# Patient Record
Sex: MALE | Race: WHITE | NOT HISPANIC OR LATINO | URBAN - METROPOLITAN AREA
[De-identification: names, ages, dates, MRNs, and addresses within clinical notes are randomized per-mention and may not be internally consistent; named-entity substitution may affect disease eponyms.]

---

## 2022-09-16 ENCOUNTER — NURSING HOME VISIT (OUTPATIENT)
Dept: GERIATRICS | Facility: OTHER | Age: 87
End: 2022-09-16
Payer: COMMERCIAL

## 2022-09-16 DIAGNOSIS — I26.99 PE (PULMONARY THROMBOEMBOLISM) (HCC): Primary | ICD-10-CM

## 2022-09-16 DIAGNOSIS — J18.9 PNEUMONIA OF BOTH LOWER LOBES DUE TO INFECTIOUS ORGANISM: ICD-10-CM

## 2022-09-16 DIAGNOSIS — N13.9 OBSTRUCTIVE UROPATHY: ICD-10-CM

## 2022-09-16 DIAGNOSIS — R55 SYNCOPE, UNSPECIFIED SYNCOPE TYPE: ICD-10-CM

## 2022-09-16 DIAGNOSIS — E87.6 HYPOKALEMIA: ICD-10-CM

## 2022-09-16 DIAGNOSIS — R53.81 PHYSICAL DECONDITIONING: ICD-10-CM

## 2022-09-16 DIAGNOSIS — I48.91 ATRIAL FIBRILLATION WITH RVR (HCC): ICD-10-CM

## 2022-09-16 DIAGNOSIS — A41.9 SEPSIS WITHOUT ACUTE ORGAN DYSFUNCTION, DUE TO UNSPECIFIED ORGANISM (HCC): ICD-10-CM

## 2022-09-16 DIAGNOSIS — C18.7 ADENOCARCINOMA OF SIGMOID COLON (HCC): ICD-10-CM

## 2022-09-16 DIAGNOSIS — D50.0 CHRONIC BLOOD LOSS ANEMIA: ICD-10-CM

## 2022-09-16 DIAGNOSIS — R77.8 ELEVATED TROPONIN: ICD-10-CM

## 2022-09-16 DIAGNOSIS — I50.32 CHRONIC DIASTOLIC CHF (CONGESTIVE HEART FAILURE) (HCC): ICD-10-CM

## 2022-09-16 PROCEDURE — 99306 1ST NF CARE HIGH MDM 50: CPT | Performed by: FAMILY MEDICINE

## 2022-09-16 NOTE — PROGRESS NOTES
[address], [place], [address]  History and Physical  POS: SNF-31    Records Reviewed include: [facility] records    Chief Complaint/ Reason for Admission: Syncope, Afib w/RVR, Sepsis 2/2 Pneumonia, Acute PE    History of Present Illness:            80year old male admitted for SNF rehab following hospitalization at [facility]  Presented following syncopal episode while using the bathroom with unresponsive period of less than one minute  Inpatient imaging included MRI which as negative for intracranial mass and EEG which was negative for seizure; found to have acute RLL subsegmental PE with associated troponin elevation and Afib w/RVR  Eliquis had been on hold due to recent GI bleed with new diagnosis of adenocarcinoma of the colon following upper and lower endoscopy; was restarted in setting of acute PE  Evaluated by cardiology; continues on metoprolol for rate control, digoxin every other day added; HR trending in the 80s since SNF admission; noted to have soft BPs with SBP in 90s (noted during inpatient stay as well)  Initial concern for pneumonia, with CT chest showing evidence of possible chronic aspiration in b/l lower lobes; received IV antibiotic therapy initially; no cough, fever or chills reported  Patient has menendez in place, which is new as of hospitalization in August, has upcoming urology follow up, continues on tamsulosin for suspected BPH  Additional active medical issues include chronic diastolic CHF on metoprolol and torsemide         Allergies    Allergies   Allergen Reactions    Medical Tape Other (See Comments)       Past Medical History  Past Medical History:   Diagnosis Date    Anemia     Atrial fibrillation ([address] )     CHF (congestive heart failure) (HCC)     Hyperlipidemia     Hypertension     TIA (transient ischemic attack)         Past Surgical History:   Procedure Laterality Date    CATARACT EXTRACTION      TONSILLECTOMY  TOTAL HIP ARTHROPLASTY Bilateral        Family History  Family History   Problem Relation Age of Onset    Brain cancer Father     Melanoma Brother        Social History  Social History     Tobacco Use   Smoking Status Former Smoker    Packs/day: 0 50    Years: 30 00    Pack years: 15 00    Types: Cigarettes   Smokeless Tobacco Former User    Types: Chew      Social History     Substance and Sexual Activity   Alcohol Use Not Currently      Social History     Substance and Sexual Activity   Drug Use Not on file        Physical Exam    Weight: 135 2lb Temp:98 2F BP:95/60 Pulse:84 Resp:18    Physical Exam  Vitals and nursing note reviewed  Constitutional:       General: He is awake  He is not in acute distress  Appearance: He is ill-appearing (chronically)  He is not toxic-appearing or diaphoretic  HENT:      Head: Normocephalic and atraumatic  Right Ear: External ear normal       Left Ear: External ear normal       Nose: No rhinorrhea  Mouth/Throat:      Mouth: Mucous membranes are moist    Eyes:      General: No scleral icterus  Right eye: No discharge  Left eye: No discharge  Conjunctiva/sclera: Conjunctivae normal    Pulmonary:      Effort: Pulmonary effort is normal  No respiratory distress  Abdominal:      General: There is no distension  Musculoskeletal:      Cervical back: No rigidity  Right lower leg: Edema (trace b/l) present  Left lower leg: Edema present  Skin:     Coloration: Skin is pale  Skin is not jaundiced  Neurological:      Mental Status: He is alert  Cranial Nerves: No dysarthria or facial asymmetry  Psychiatric:         Attention and Perception: Attention and perception normal          Behavior: Behavior is cooperative  Review of Systems:  Review of Systems   Constitutional: Negative for chills and fever  Respiratory: Negative for cough and shortness of breath  Cardiovascular: Negative for leg swelling  Gastrointestinal: Positive for diarrhea  Negative for abdominal pain, blood in stool, nausea and vomiting  Genitourinary: Negative for hematuria  Neurological: Negative for dizziness, syncope and light-headedness  All other systems reviewed and are negative        List of Current Medications: Medication list reviewed and updated in Epic to reflect most current [facility] orders    Labs/Diagnostics (reviewed by this provider): Hospital Paperwork  CBC: Hb:8 7  BMP: K:3 4 Cr:1 00  D-dimer: 2 74    Microbiology:  Urine Cx: no growth    Imaging Reviewed:  EKG: (9/12/22) Afib w/RVR  CT Chest (9/12/22) RLL PE in subsegmental artery; moderate b/l pleural effusions with compressive atelectasis; scattered punctate densities in b/l lower lobes  B/l LE venous duplex (9/14/22) negative for DVT b/l  EEG (9/12/22) consistent with mild diffuse encephalopathy  MRI brain (9/12/22) no acute infarct, hemorrhage; no mass lesion    Assessment/Plan:  80year old male with:    PE (pulmonary thromboembolism) ([address] )  Acute RLL subsegmental  Continue anticoagulation with eliquis  Monitor respiratory status and oxygen levels closely    Pneumonia  Initial IV antibiotics; concern for bilateral chronic aspiration per CT chest  WBC WNL, asymptomatic; continue to observe off on antibiotic therapy  Change mucinex to BID PRN    Atrial fibrillation with RVR (HCC)  Rate/rhythm control with metoprolol and digoxin; goal HR<100  Continue anticoagulation with eliquis    Syncope  Likely component of vasovagal as occurred when having a bowel movement; additionally with Afib w/RVR and acute PE as component- management as outlined  Add hold parameters for metoprolol and torsemide    Sepsis ([address] )  In setting of pneumonia; with recent Ecoli bacteremia in setting of newly found colon cancer  Afebrile, hemodynamically stable; monitor closely  CBC, BMP ordered for 9/19    Chronic diastolic CHF (congestive heart failure) ([address] )  EF 60-65% 12/21  Monitor daily weights/CHF pathway  Continue metoprolol, torsemide- hold parameters added  BMP ordered for 9/19    Hypokalemia  In setting of diuretic use, chronic loose stool in setting of colon cancer  Continue PO KCl supplementation  Most recent Mg level 1 7- plan to recheck if K remains low on follow up labs  BMP ordered for 9/19    Chronic blood loss anemia  With recent acute GI bleed; in setting of chronic blood loss related to colon adenocarcinoma  Continue pantoprazole BID  Monitor Hgb closely with recent eliquis restart for anticoagulation for Afib and PE  CBC ordered for 9/19    Elevated troponin  Likely secondary to demand in setting of PE, pneumonia, Afib w/RVR    Obstructive uropathy  Rodriguez catheter in place- newly placed during recent hospitalization  Continue tamsulosin  Follow up with urology 9/22 as scheduled    Adenocarcinoma of sigmoid colon [facility])  Newly diagnosed during hospital stay in August  With associated anemia and chronic loose stool  Outpatient oncology follow up    Physical deconditioning  Multifactorial in setting of above  With baseline ambulatory dysfunction  Admit to SNF for rehab  PT/OT consults placed- evaluate and treat  Supportive care, nutritional support, ADL support  Fall precautions  Management of acute and chronic medical conditions as outlined    Pain: No  Rehab Potential:Fair  Patient Informed of Medical Condition: Yes  Patient is Capable of Understanding Their Right: Yes  Prognosis:Fair  Discharge Plan:   Surrogate Decision Maker:  Advanced Directives:   Code status:Code status order updated to DNR/DNI while at SNF  PCP: Sayra Dobson MD    Immunization History   Administered Date(s) Administered    COVID-19 PFIZER VACCINE 0 3 ML IM 03/02/2021, 03/22/2021, 10/05/2021    INFLUENZA 11/14/2005, 11/15/2006, 11/15/2007, 11/07/2008, 10/12/2009, 10/18/2010, 11/14/2011, 11/14/2012, 10/16/2013, 10/09/2014    Influenza Split High Dose Preservative Free IM 11/20/2015, 11/02/2016, 10/09/2017, 10/08/2018, 11/04/2019    Influenza, Seasonal Vaccine, Quadrivalent, Adjuvanted,  5e 11/11/2020, 10/05/2021    Pneumococcal Conjugate 13-Valent 01/04/2016    Pneumococcal Polysaccharide PPV23 11/11/1998, 11/15/2006    Td (adult), Unspecified 05/13/1991, 10/09/2002    Tdap 03/13/2017     Albert Pichardo DO  9/16/22

## 2022-09-19 PROBLEM — R55 SYNCOPE: Status: ACTIVE | Noted: 2022-09-12

## 2022-09-19 PROBLEM — I48.91 ATRIAL FIBRILLATION WITH RVR (HCC): Status: ACTIVE | Noted: 2022-09-12

## 2022-09-19 PROBLEM — J18.9 PNEUMONIA: Status: ACTIVE | Noted: 2022-09-12

## 2022-09-19 PROBLEM — E87.6 HYPOKALEMIA: Status: ACTIVE | Noted: 2022-09-19

## 2022-09-19 PROBLEM — R53.81 PHYSICAL DECONDITIONING: Status: ACTIVE | Noted: 2022-09-19

## 2022-09-19 PROBLEM — I50.32 CHRONIC DIASTOLIC CHF (CONGESTIVE HEART FAILURE) (HCC): Status: ACTIVE | Noted: 2022-09-08

## 2022-09-19 PROBLEM — D64.9 ANEMIA: Status: ACTIVE | Noted: 2022-08-14

## 2022-09-19 PROBLEM — C18.7 ADENOCARCINOMA OF SIGMOID COLON (HCC): Status: ACTIVE | Noted: 2022-08-27

## 2022-09-19 PROBLEM — R77.8 ELEVATED TROPONIN: Status: ACTIVE | Noted: 2022-09-19

## 2022-09-19 PROBLEM — K92.2 GI BLEED: Status: ACTIVE | Noted: 2022-08-14

## 2022-09-19 PROBLEM — I26.99 PE (PULMONARY THROMBOEMBOLISM) (HCC): Status: ACTIVE | Noted: 2022-09-13

## 2022-09-19 PROBLEM — A41.9 SEPSIS (HCC): Status: ACTIVE | Noted: 2022-09-12

## 2022-09-19 PROBLEM — D50.0 CHRONIC BLOOD LOSS ANEMIA: Status: ACTIVE | Noted: 2022-08-14

## 2022-09-19 PROBLEM — N13.9 OBSTRUCTIVE UROPATHY: Status: ACTIVE | Noted: 2022-09-19

## 2022-09-19 RX ORDER — GUAIFENESIN 600 MG/1
600 TABLET, EXTENDED RELEASE ORAL EVERY 12 HOURS PRN
COMMUNITY

## 2022-09-19 RX ORDER — POTASSIUM CHLORIDE 20 MEQ/1
20 TABLET, EXTENDED RELEASE ORAL DAILY
COMMUNITY
Start: 2022-08-31 | End: 2023-08-31

## 2022-09-19 RX ORDER — DIAPER,BRIEF,ADULT, DISPOSABLE
1200 EACH MISCELLANEOUS DAILY
COMMUNITY

## 2022-09-19 RX ORDER — FLUTICASONE PROPIONATE 50 MCG
2 SPRAY, SUSPENSION (ML) NASAL DAILY
COMMUNITY
Start: 2022-05-09

## 2022-09-19 RX ORDER — TAMSULOSIN HYDROCHLORIDE 0.4 MG/1
0.4 CAPSULE ORAL DAILY
COMMUNITY
Start: 2022-08-19 | End: 2023-08-19

## 2022-09-19 RX ORDER — TORSEMIDE 20 MG/1
20 TABLET ORAL DAILY
COMMUNITY
Start: 2022-08-31 | End: 2022-10-03 | Stop reason: ALTCHOICE

## 2022-09-19 RX ORDER — ASCORBIC ACID 500 MG
1 TABLET ORAL DAILY
COMMUNITY

## 2022-09-19 RX ORDER — PANTOPRAZOLE SODIUM 40 MG/1
40 TABLET, DELAYED RELEASE ORAL 2 TIMES DAILY
COMMUNITY
Start: 2022-08-19

## 2022-09-19 RX ORDER — DIGOXIN 125 MCG
0.12 TABLET ORAL EVERY OTHER DAY
COMMUNITY
Start: 2022-09-16 | End: 2023-09-16

## 2022-09-19 RX ORDER — METOPROLOL SUCCINATE 25 MG/1
12.5 TABLET, EXTENDED RELEASE ORAL DAILY
COMMUNITY
Start: 2022-09-16 | End: 2023-09-16

## 2022-09-19 NOTE — ASSESSMENT & PLAN NOTE
In setting of pneumonia; with recent Ecoli bacteremia in setting of newly found colon cancer  Afebrile, hemodynamically stable; monitor closely  CBC, BMP ordered for 9/19

## 2022-09-19 NOTE — ASSESSMENT & PLAN NOTE
Likely component of vasovagal as occurred when having a bowel movement; additionally with Afib w/RVR and acute PE as component- management as outlined  Add hold parameters for metoprolol and torsemide

## 2022-09-19 NOTE — ASSESSMENT & PLAN NOTE
Initial IV antibiotics; concern for bilateral chronic aspiration per CT chest  WBC WNL, asymptomatic; continue to observe off on antibiotic therapy  Change mucinex to BID PRN

## 2022-09-19 NOTE — ASSESSMENT & PLAN NOTE
Multifactorial in setting of above  With baseline ambulatory dysfunction  Admit to SNF for rehab  PT/OT consults placed- evaluate and treat  Supportive care, nutritional support, ADL support  Fall precautions  Management of acute and chronic medical conditions as outlined

## 2022-09-19 NOTE — ASSESSMENT & PLAN NOTE
EF 60-65% 12/21  Monitor daily weights/CHF pathway  Continue metoprolol, torsemide- hold parameters added  BMP ordered for 9/19

## 2022-09-19 NOTE — ASSESSMENT & PLAN NOTE
Rodriguez catheter in place- newly placed during recent hospitalization  Continue tamsulosin  Follow up with urology 9/22 as scheduled

## 2022-09-19 NOTE — ASSESSMENT & PLAN NOTE
In setting of diuretic use, chronic loose stool in setting of colon cancer  Continue PO KCl supplementation  Most recent Mg level 1 7- plan to recheck if K remains low on follow up labs  BMP ordered for 9/19

## 2022-09-19 NOTE — ASSESSMENT & PLAN NOTE
Acute RLL subsegmental  Continue anticoagulation with eliquis  Monitor respiratory status and oxygen levels closely

## 2022-09-19 NOTE — ASSESSMENT & PLAN NOTE
Newly diagnosed during hospital stay in August  With associated anemia and chronic loose stool  Outpatient oncology follow up

## 2022-09-19 NOTE — ASSESSMENT & PLAN NOTE
With recent acute GI bleed; in setting of chronic blood loss related to colon adenocarcinoma  Continue pantoprazole BID  Monitor Hgb closely with recent eliquis restart for anticoagulation for Afib and PE  CBC ordered for 9/19

## 2022-09-20 ENCOUNTER — NURSING HOME VISIT (OUTPATIENT)
Dept: GERIATRICS | Facility: OTHER | Age: 87
End: 2022-09-20
Payer: COMMERCIAL

## 2022-09-20 DIAGNOSIS — J18.9 PNEUMONIA OF BOTH LOWER LOBES DUE TO INFECTIOUS ORGANISM: ICD-10-CM

## 2022-09-20 DIAGNOSIS — R53.81 PHYSICAL DECONDITIONING: ICD-10-CM

## 2022-09-20 DIAGNOSIS — I50.32 CHRONIC DIASTOLIC CHF (CONGESTIVE HEART FAILURE) (HCC): ICD-10-CM

## 2022-09-20 DIAGNOSIS — D50.0 CHRONIC BLOOD LOSS ANEMIA: ICD-10-CM

## 2022-09-20 DIAGNOSIS — C18.7 ADENOCARCINOMA OF SIGMOID COLON (HCC): ICD-10-CM

## 2022-09-20 DIAGNOSIS — E87.6 HYPOKALEMIA: ICD-10-CM

## 2022-09-20 DIAGNOSIS — N13.9 OBSTRUCTIVE UROPATHY: ICD-10-CM

## 2022-09-20 DIAGNOSIS — I26.99 PE (PULMONARY THROMBOEMBOLISM) (HCC): Primary | ICD-10-CM

## 2022-09-20 PROCEDURE — 99309 SBSQ NF CARE MODERATE MDM 30: CPT | Performed by: NURSE PRACTITIONER

## 2022-09-20 NOTE — ASSESSMENT & PLAN NOTE
Acute RLL subsegmental  Respiratory status is stable  Patient is currently on Eliquis for anticoagulation    No signs of active bleeding noted

## 2022-09-20 NOTE — PROGRESS NOTES
Facility: [facility], [place], operated by [facility]  POS: 31 (STR)  Progress Note    Chief Complaint/Reason for visit: STR follow up visit  History obtained from patient, nursing staff, and EMR  History of Present Illness:  70-year-old male seen and examined for STR follow up of acute and chronic medical conditions  At time of examination, patient is seated in chair eating lunch, and appears in no distress  He denies having pain or discomfort at time of exam   Denies shortness of breath, dizziness, lightheadedness, presyncopal or syncopal episodes  Patient states that his appetite is good  He is participating in therapy and feels that he is doing well  Ambulatory with walker (has own walker)  Past Medical History: unchanged from history and physical  Past Medical History:   Diagnosis Date    Anemia     Atrial fibrillation ([address] )     CHF (congestive heart failure) (HCC)     Hyperlipidemia     Hypertension     TIA (transient ischemic attack)      Family History: unchanged from history and physical  Social History: unchanged from history and physical  Review of systems: Review of Systems   Constitutional: Negative for chills, diaphoresis and fatigue  HENT: Negative for congestion, sore throat and trouble swallowing  Eyes: Positive for visual disturbance  Respiratory: Negative for cough and shortness of breath  Cardiovascular: Negative for chest pain  Gastrointestinal: Negative for abdominal pain, constipation, diarrhea and nausea  Soft stools   Endocrine: Negative  Genitourinary: Rodriguez   Musculoskeletal: Positive for gait problem  Neurological: Negative for dizziness, syncope, speech difficulty, light-headedness, numbness and headaches  Psychiatric/Behavioral: Negative for confusion, dysphoric mood, hallucinations and sleep disturbance  The patient is not nervous/anxious  All other systems reviewed and are negative  Medications:  All medication and routine orders were reviewed and updated  Allergies: NKDA  Consults reviewed: Other  Labs/Diagnostics (reviewed by this provider): Copy in Chart    Imaging Reviewed:  None today    Physical Exam    Weight:  133 lb Temp:  98 4         BP:  110/66  Pulse:  62 Resp: 18      O2 Sat:   Constitutional: Normocephalic  Orientation:Person, Place and Day     Physical Exam  Vitals and nursing note reviewed  Constitutional:       General: He is not in acute distress  Appearance: He is not toxic-appearing or diaphoretic  Comments: Elderly male who appears with chronic illness  HENT:      Head: Normocephalic  Nose: No congestion or rhinorrhea  Mouth/Throat:      Mouth: Mucous membranes are moist       Pharynx: No oropharyngeal exudate  Eyes:      General: No scleral icterus  Right eye: No discharge  Left eye: No discharge  Extraocular Movements: Extraocular movements intact  Conjunctiva/sclera: Conjunctivae normal       Pupils: Pupils are equal, round, and reactive to light  Cardiovascular:      Rate and Rhythm: Normal rate and regular rhythm  Pulses: Normal pulses  Pulmonary:      Effort: Pulmonary effort is normal  No respiratory distress  Breath sounds: Normal breath sounds  No wheezing, rhonchi or rales  Abdominal:      General: Bowel sounds are normal  There is no distension  Palpations: Abdomen is soft  Tenderness: There is no abdominal tenderness  There is no guarding  Genitourinary:     Comments: Indwelling urinary catheter in place and patent for yellow urine  Musculoskeletal:      Cervical back: Neck supple  No rigidity  Right lower leg: Edema (Trace edema) present  Left lower leg: Edema (Trace edema) present  Comments: Moves all 4 extremities  Skin:     General: Skin is warm and dry  Capillary Refill: Capillary refill takes less than 2 seconds  Findings: Erythema present  Neurological:      Mental Status: He is alert   Mental status is at baseline  Cranial Nerves: No cranial nerve deficit  Motor: Weakness present  Gait: Gait abnormal    Psychiatric:         Mood and Affect: Mood normal          Behavior: Behavior normal          Thought Content: Thought content normal        Assessment/Plan:  51-year-old male with:    PE (pulmonary thromboembolism) ([address] )  Acute RLL subsegmental  Respiratory status is stable  Patient is currently on Eliquis for anticoagulation  No signs of active bleeding noted    Pneumonia  With concern for bilateral chronic aspiration as per CT of chest inpatient  Recent sepsis in setting of pneumonia  Patient was treated with IV antibiotics  Respiratory status is stable on room air  O2 sat 96% RA  Continue Mucinex b i d  p r n  Chronic diastolic CHF (congestive heart failure) ([facility])  EF 60-65% December 2021  Patient appears euvolemic on exam  Continue torsemide 20 mg daily with hold parameter  Continue metoprolol succinate 12 5 mg daily with hold parameter  BMP on 09/19/2022 stable for patient    Hypokalemia  In setting of diuretic use, chronic loose stool in setting of colon cancer  Most recent potassium level 3 7 on 09/19/2022    Chronic blood loss anemia  In setting of chronic blood loss related to colon adenocarcinoma  Most recent hemoglobin 9 2 on 09/19/2022  Recheck CBC on 09/26/2022    Adenocarcinoma of sigmoid colon [facility])  Newly diagnosed during hospital stay in August 2022  With chronic loose stool and anemia  Follow-up with oncology outpatient    Obstructive uropathy  Requiring indwelling urinary catheter inpatient   Continue tamsulosin  Indwelling urinary catheter to remain in place until seen by Urology on 09/22/2022    Physical deconditioning  Multifactorial  Continue PT/OT  Continue fall precautions  Provide supportive care with ADLs  Ensure adequate hydration and nutrition    This note was completed in part utilizing m-modal fluency direct voice recognition software    Grammatical errors, random word insertion, spelling mistakes, and incomplete sentences may be an occasional consequence of the system secondary to software limitations, ambient noise and hardware issues  At the time of dictation, efforts were made to edit, clarify and/or correct errors  Please read the chart carefully and recognize, using context, where substitutions have occurred  If you have any questions or concerns about the context, text or information contained within the body of this dictation, please contact myself, the provider, for further clarification      [address]  9/[id number]/47954:[id number] PM

## 2022-09-20 NOTE — ASSESSMENT & PLAN NOTE
Multifactorial  Continue PT/OT  Continue fall precautions  Provide supportive care with ADLs  Ensure adequate hydration and nutrition

## 2022-09-20 NOTE — ASSESSMENT & PLAN NOTE
In setting of diuretic use, chronic loose stool in setting of colon cancer  Most recent potassium level 3 7 on 09/19/2022

## 2022-09-20 NOTE — ASSESSMENT & PLAN NOTE
Newly diagnosed during hospital stay in August 2022  With chronic loose stool and anemia  Follow-up with oncology outpatient

## 2022-09-20 NOTE — ASSESSMENT & PLAN NOTE
Requiring indwelling urinary catheter inpatient   Continue tamsulosin  Indwelling urinary catheter to remain in place until seen by Urology on 09/22/2022

## 2022-09-20 NOTE — ASSESSMENT & PLAN NOTE
EF 60-65% December 2021  Patient appears euvolemic on exam  Continue torsemide 20 mg daily with hold parameter  Continue metoprolol succinate 12 5 mg daily with hold parameter  BMP on 09/19/2022 stable for patient

## 2022-09-20 NOTE — ASSESSMENT & PLAN NOTE
In setting of chronic blood loss related to colon adenocarcinoma  Most recent hemoglobin 9 2 on 09/19/2022  Recheck CBC on 09/26/2022

## 2022-09-23 ENCOUNTER — NURSING HOME VISIT (OUTPATIENT)
Dept: GERIATRICS | Facility: OTHER | Age: 87
End: 2022-09-23
Payer: COMMERCIAL

## 2022-09-23 DIAGNOSIS — N13.9 OBSTRUCTIVE UROPATHY: ICD-10-CM

## 2022-09-23 DIAGNOSIS — C18.7 ADENOCARCINOMA OF SIGMOID COLON (HCC): Primary | ICD-10-CM

## 2022-09-23 DIAGNOSIS — D50.0 CHRONIC BLOOD LOSS ANEMIA: ICD-10-CM

## 2022-09-23 DIAGNOSIS — I50.32 CHRONIC DIASTOLIC CHF (CONGESTIVE HEART FAILURE) (HCC): ICD-10-CM

## 2022-09-23 DIAGNOSIS — I26.99 PE (PULMONARY THROMBOEMBOLISM) (HCC): ICD-10-CM

## 2022-09-23 DIAGNOSIS — R53.81 PHYSICAL DECONDITIONING: ICD-10-CM

## 2022-09-23 DIAGNOSIS — I48.91 ATRIAL FIBRILLATION WITH RVR (HCC): ICD-10-CM

## 2022-09-23 PROCEDURE — 99309 SBSQ NF CARE MODERATE MDM 30: CPT | Performed by: NURSE PRACTITIONER

## 2022-09-23 NOTE — ASSESSMENT & PLAN NOTE
Multifactorial  Continue supportive care at SNF for ADLs  Continue PT/OT  Continue fall precautions  Provide nutritional support  Management of acute and chronic medical conditions

## 2022-09-23 NOTE — ASSESSMENT & PLAN NOTE
Patient appears dry on exam, losing fluid due to loose stools  Will discontinue torsemide  Continue metoprolol with hold parameter  Will monitor electrolytes closely

## 2022-09-23 NOTE — PROGRESS NOTES
Facility: [facility]  POS: 31 (STR)  Progress Note    Chief Complaint/Reason for visit: STR follow up  History obtained from patient, nursing staff, and EMR  Code status: DNR/DNI  History of Present Illness:  15-year-old male seen and examined for STR follow up of acute and chronic medical conditions  At time of examination,  patient is seated in chair, and appears in no distress  Patient had episode of blood mixed in stool on 09/21 evening shift and continues to have episodes of loose stools  He had loose stools x3 on evening shift and none on nightshift  He denies having pain or discomfort, SOB, or fatigue  Hemoglobin and hematocrit remained stable  Patient reports that he feels fine and ambulated yesterday from the gym to the elevator  Past Medical History: unchanged from history and physical  Past Medical History:   Diagnosis Date    Anemia     Atrial fibrillation ([address] )     CHF (congestive heart failure) (HCC)     Hyperlipidemia     Hypertension     TIA (transient ischemic attack)      Family History: unchanged from history and physical  Social History: unchanged from history and physical  Review of systems: Review of Systems   Constitutional: Negative for appetite change, chills and diaphoresis  HENT: Negative for congestion  Eyes: Positive for visual disturbance  Respiratory: Negative for cough and shortness of breath  Cardiovascular: Negative  Gastrointestinal: Negative for abdominal pain  Genitourinary:        Indwelling urinary catheter   Musculoskeletal: Positive for gait problem  Neurological: Negative for dizziness, syncope, speech difficulty, light-headedness, numbness and headaches  Psychiatric/Behavioral: Negative for agitation, behavioral problems, confusion, dysphoric mood and hallucinations  The patient is not nervous/anxious  All other systems reviewed and are negative  Medications:  All medication and routine orders were reviewed and updated  Allergies: NKDA  Consults reviewed:PT, OT and Other  Labs/Diagnostics (reviewed by this provider): Copy in Chart    Imaging Reviewed:  None today    Physical Exam    Weight:  129 4 lb Temp: 99          BP:  100/60  Pulse:  58 Resp: 18      O2 Sat:  97% on  Constitutional: Pallor frail appearing  Orientation:Person and Place     Physical Exam  Vitals and nursing note reviewed  Constitutional:       General: He is not in acute distress  Appearance: He is ill-appearing  He is not toxic-appearing or diaphoretic  Comments: Thin and frail elderly male who appears with chronic illness  HENT:      Head: Normocephalic  Nose: No congestion or rhinorrhea  Mouth/Throat:      Mouth: Mucous membranes are moist       Pharynx: No oropharyngeal exudate  Eyes:      General:         Right eye: No discharge  Left eye: No discharge  Extraocular Movements: Extraocular movements intact  Conjunctiva/sclera: Conjunctivae normal       Pupils: Pupils are equal, round, and reactive to light  Cardiovascular:      Rate and Rhythm: Normal rate  Rhythm irregular  Pulses: Normal pulses  Pulmonary:      Effort: Pulmonary effort is normal  No respiratory distress  Breath sounds: Normal breath sounds  No wheezing, rhonchi or rales  Abdominal:      General: Bowel sounds are normal  There is no distension  Palpations: Abdomen is soft  Tenderness: There is no abdominal tenderness  There is no guarding  Genitourinary:     Comments: Indwelling urinary catheter intact and patent for yellow urine  Musculoskeletal:      Cervical back: Neck supple  No rigidity  Right lower leg: No edema  Left lower leg: No edema  Comments: Moves all 4 extremities  Lymphadenopathy:      Cervical: No cervical adenopathy  Skin:     General: Skin is warm and dry  Capillary Refill: Capillary refill takes less than 2 seconds  Neurological:      Mental Status: He is alert   Mental status is at baseline  Cranial Nerves: No cranial nerve deficit  Motor: Weakness present  Gait: Gait abnormal    Psychiatric:         Mood and Affect: Mood normal          Behavior: Behavior normal          Thought Content: Thought content normal        Assessment/Plan:  80-year-old male with:    Adenocarcinoma of sigmoid colon ([facility] )  Newly diagnosed during hospital stay August 2022  Loose stools continues and nursing noted blood in stool on 09/21/2022  Hemoglobin remained stable at 9 3/hematocrit 27 3 today  Patient denies having abdominal pain, shortness a breath or increased fatigue    Chronic blood loss anemia  In setting of chronic blood loss related to colon adenocarcinoma  Awaiting hemoglobin and hematocrit test from today    Atrial fibrillation with RVR (HCC)  Heart rate trending 60s to 80s with occasional 50s  Continue metoprolol  Continue Eliquis for anticoagulation  Monitor closely due to anemia     Chronic diastolic CHF (congestive heart failure) ([place] [address] )  Patient appears dry on exam, losing fluid due to loose stools  Will discontinue torsemide  Continue metoprolol with hold parameter  Will monitor electrolytes closely    Obstructive uropathy  With indwelling urinary catheter  Continue tamsulosin  Follow up with Urology on 09/27/2022    PE (pulmonary thromboembolism) ([place] [address] )  Acute RLL subsegmental  Oxygenating well on room air  Denies having pain or discomfort  No respiratory distress episodes reported  Patient is currently on Eliquis for anticoagulation    Physical deconditioning  Multifactorial  Continue supportive care at SNF for ADLs  Continue PT/OT  Continue fall precautions  Provide nutritional support  Management of acute and chronic medical conditions    This note was completed in part utilizing GELI direct voice recognition software    Grammatical errors, random word insertion, spelling mistakes, and incomplete sentences may be an occasional consequence of the system secondary to software limitations, ambient noise and hardware issues  At the time of dictation, efforts were made to edit, clarify and/or correct errors  Please read the chart carefully and recognize, using context, where substitutions have occurred  If you have any questions or concerns about the context, text or information contained within the body of this dictation, please contact myself, the provider, for further clarification      [address], [address] [facility]  1/46/[id number]:68 AM

## 2022-09-23 NOTE — ASSESSMENT & PLAN NOTE
In setting of chronic blood loss related to colon adenocarcinoma  Awaiting hemoglobin and hematocrit test from today

## 2022-09-23 NOTE — ASSESSMENT & PLAN NOTE
Acute RLL subsegmental  Oxygenating well on room air  Denies having pain or discomfort    No respiratory distress episodes reported  Patient is currently on Eliquis for anticoagulation

## 2022-09-23 NOTE — ASSESSMENT & PLAN NOTE
Heart rate trending 60s to 80s with occasional 50s  Continue metoprolol  Continue Eliquis for anticoagulation  Monitor closely due to anemia

## 2022-09-23 NOTE — ASSESSMENT & PLAN NOTE
Newly diagnosed during hospital stay August 2022    Loose stools continues and nursing noted blood in stool on 09/21/2022  Hemoglobin remained stable at 9 3/hematocrit 27 3 today  Patient denies having abdominal pain, shortness a breath or increased fatigue

## 2022-10-03 ENCOUNTER — NURSING HOME VISIT (OUTPATIENT)
Dept: GERIATRICS | Facility: OTHER | Age: 87
End: 2022-10-03
Payer: COMMERCIAL

## 2022-10-03 DIAGNOSIS — I50.32 CHRONIC DIASTOLIC CHF (CONGESTIVE HEART FAILURE) (HCC): ICD-10-CM

## 2022-10-03 DIAGNOSIS — N13.9 OBSTRUCTIVE UROPATHY: ICD-10-CM

## 2022-10-03 DIAGNOSIS — R53.81 PHYSICAL DECONDITIONING: ICD-10-CM

## 2022-10-03 DIAGNOSIS — D50.0 CHRONIC BLOOD LOSS ANEMIA: ICD-10-CM

## 2022-10-03 DIAGNOSIS — E43 SEVERE PROTEIN-CALORIE MALNUTRITION (HCC): ICD-10-CM

## 2022-10-03 DIAGNOSIS — C18.7 ADENOCARCINOMA OF SIGMOID COLON (HCC): ICD-10-CM

## 2022-10-03 DIAGNOSIS — I48.91 ATRIAL FIBRILLATION WITH RVR (HCC): ICD-10-CM

## 2022-10-03 DIAGNOSIS — J18.9 PNEUMONIA OF BOTH LOWER LOBES DUE TO INFECTIOUS ORGANISM: ICD-10-CM

## 2022-10-03 DIAGNOSIS — K59.1 FUNCTIONAL DIARRHEA: Primary | ICD-10-CM

## 2022-10-03 PROCEDURE — 99309 SBSQ NF CARE MODERATE MDM 30: CPT | Performed by: NURSE PRACTITIONER

## 2022-10-03 NOTE — ASSESSMENT & PLAN NOTE
Heart rate stable  Continue metoprolol with hold parameter  Continue Eliquis for anticoagulation  Monitor CBC closely

## 2022-10-03 NOTE — ASSESSMENT & PLAN NOTE
In setting of chronic blood loss related to colon adenocarcinoma  Most recent hemoglobin 9 0/hematocrit 27 1

## 2022-10-03 NOTE — ASSESSMENT & PLAN NOTE
Torsemide was discontinued on 09/23/2022 due to frequent loss of fluid due to loose stools    No signs or symptoms of fluid overload noted on exam today  Continue metoprolol with hold parameter  Most recent BMP stable

## 2022-10-03 NOTE — PROGRESS NOTES
Facility: [facility]  POS: 31 (STR)  Progress Note    Chief Complaint/Reason for visit: STR follow up visit  History obtained from patient, nursing staff, and EMR  History of Present Illness:  80-year-old male seen and examined for STR follow up of acute and chronic medical conditions  Patient was evaluated by Cardiology and urology on 09/27/2022; notes reviewed  Nursing reports that patient's temp was 99 2 on 10/02/2022 with decreased appetite and decreased urine output  Vital signs has since been stable  Spoke to nurse this morning and patient's nurse felt that patient was most likely dehydrated  At time of examination, patient is seated in chair, and appears in no distress  He denies having change in appetite or change in condition  He ate 80% of his breakfast this morning and drink all of his nutritional supplement  Vital signs are stable  No issues with indwelling urinary catheter  Denies chills, fever, suprapubic tenderness, abdominal pain, or flank pain  Past Medical History: unchanged from history and physical  Past Medical History:   Diagnosis Date    Anemia     Atrial fibrillation ([address] )     CHF (congestive heart failure) (HCC)     Hyperlipidemia     Hypertension     TIA (transient ischemic attack)      Family History: unchanged from history and physical  Social History: unchanged from history and physical  Review of systems: Review of Systems   Constitutional: Negative for chills and diaphoresis  HENT: Negative for sore throat  Eyes: Positive for visual disturbance  Respiratory: Negative for cough and shortness of breath  Cardiovascular: Negative for chest pain and palpitations  Gastrointestinal: Negative for abdominal pain  Genitourinary: Negative for flank pain and penile pain  Musculoskeletal: Positive for gait problem  Neurological: Negative for dizziness and speech difficulty     Psychiatric/Behavioral: Negative for behavioral problems, confusion, dysphoric mood and hallucinations  The patient is not nervous/anxious  Medications: All medication and routine orders were reviewed and updated  Allergies: NKDA  Consults reviewed:PT, OT and Other  Labs/Diagnostics (reviewed by this provider): Copy in Chart    Imaging Reviewed:  None today    Physical Exam    Weight:  Temp: 97 4          BP:  101/67  Pulse:  74 Resp: 18      O2 Sat:  Constitutional: Normocephalic  Orientation:Person, Place and Day     Physical Exam  Vitals and nursing note reviewed  Constitutional:       General: He is not in acute distress  Appearance: He is not toxic-appearing or diaphoretic  Comments: Elderly male who appears with chronic illness  HENT:      Head: Normocephalic  Nose: No congestion or rhinorrhea  Mouth/Throat:      Mouth: Mucous membranes are moist       Pharynx: No oropharyngeal exudate  Eyes:      General: No scleral icterus  Right eye: No discharge  Left eye: No discharge  Extraocular Movements: Extraocular movements intact  Conjunctiva/sclera: Conjunctivae normal       Pupils: Pupils are equal, round, and reactive to light  Comments: Wears glasses  Cardiovascular:      Rate and Rhythm: Normal rate  Pulses: Normal pulses  Pulmonary:      Effort: Pulmonary effort is normal  No respiratory distress  Breath sounds: Normal breath sounds  No wheezing, rhonchi or rales  Abdominal:      General: Bowel sounds are normal  There is no distension  Palpations: Abdomen is soft  Tenderness: There is no abdominal tenderness  There is no guarding  Genitourinary:     Comments: Indwelling urinary catheter intact and patent for yellow urine  Musculoskeletal:      Cervical back: Neck supple  No rigidity  Right lower leg: No edema  Left lower leg: No edema  Comments: Moves all 4 extremities  Lymphadenopathy:      Cervical: No cervical adenopathy  Skin:     General: Skin is warm and dry        Capillary Refill: Capillary refill takes less than 2 seconds  Coloration: Skin is pale  Neurological:      General: No focal deficit present  Mental Status: He is alert and oriented to person, place, and time  Motor: Weakness present  Gait: Gait abnormal    Psychiatric:         Mood and Affect: Mood normal          Behavior: Behavior normal          Thought Content: Thought content normal        Assessment/Plan:  25-year-old male with:    Diarrhea  Persistent diarrhea  Loperamide was discontinued until C diff ruled out    Adenocarcinoma of sigmoid colon (HCC)  With loose stools  Recent hemoglobin 9 0/hematocrit 27 1 on 09/26/2022  BMP stable for patient    Chronic blood loss anemia  In setting of chronic blood loss related to colon adenocarcinoma  Most recent hemoglobin 9 0/hematocrit 27 1    Chronic diastolic CHF (congestive heart failure) ([facility] [address] )  Torsemide was discontinued on 09/23/2022 due to frequent loss of fluid due to loose stools  No signs or symptoms of fluid overload noted on exam today  Was seen by Cardiology on 09/27/2022  BMP ordered by Cardiology in 2 weeks  Continue metoprolol with hold parameter  Most recent BMP stable    Atrial fibrillation with RVR ([facility])  Heart rate stable  Continue metoprolol with hold parameter  Continue Eliquis for anticoagulation  Monitor CBC closely    Obstructive uropathy  Continue indwelling urinary catheter   Continue tamsulosin  Patient had follow-up with urology as planned on 09/27/2022 and had catheter exchanged in the office 18 French 10 mL balloon    Patient to return in 4 weeks for catheter change    Pneumonia  Respiratory status has been stable  Oxygenating well on room air  Continue Mucinex b i d  as needed    Physical deconditioning  Multifactorial  Continue care and support at SNF for ADLs  Continue PT/OT  Continue fall precautions  Provide nutritional support    Severe protein-calorie malnutrition ([facility] [address] )  In setting of acute and chronic medical conditions as evidenced by low BMI, weight loss, loss of subcutaneous and muscle tissue  Weights have been stable  Continue nutritional supplements  Consult dietitian as needed  Continue to monitor    This note was completed in part utilizing m-modal fluency direct voice recognition software  Grammatical errors, random word insertion, spelling mistakes, and incomplete sentences may be an occasional consequence of the system secondary to software limitations, ambient noise and hardware issues  At the time of dictation, efforts were made to edit, clarify and/or correct errors  Please read the chart carefully and recognize, using context, where substitutions have occurred  If you have any questions or concerns about the context, text or information contained within the body of this dictation, please contact myself, the provider, for further clarification      Shital Darby  10/3/354188:12 AM

## 2022-10-03 NOTE — ASSESSMENT & PLAN NOTE
In setting of acute and chronic medical conditions as evidenced by low BMI, weight loss, loss of subcutaneous and muscle tissue  Weights have been stable  Continue nutritional supplements  Consult dietitian as needed  Continue to monitor

## 2022-10-03 NOTE — ASSESSMENT & PLAN NOTE
Multifactorial  Continue care and support at SNF for ADLs  Continue PT/OT  Continue fall precautions  Provide nutritional support

## 2022-10-05 ENCOUNTER — NURSING HOME VISIT (OUTPATIENT)
Dept: GERIATRICS | Facility: OTHER | Age: 87
End: 2022-10-05
Payer: COMMERCIAL

## 2022-10-05 DIAGNOSIS — N13.9 OBSTRUCTIVE UROPATHY: ICD-10-CM

## 2022-10-05 DIAGNOSIS — R53.81 PHYSICAL DECONDITIONING: ICD-10-CM

## 2022-10-05 DIAGNOSIS — I48.91 ATRIAL FIBRILLATION WITH RVR (HCC): ICD-10-CM

## 2022-10-05 DIAGNOSIS — D50.0 CHRONIC BLOOD LOSS ANEMIA: ICD-10-CM

## 2022-10-05 DIAGNOSIS — E43 SEVERE PROTEIN-CALORIE MALNUTRITION (HCC): ICD-10-CM

## 2022-10-05 DIAGNOSIS — I50.32 CHRONIC DIASTOLIC CHF (CONGESTIVE HEART FAILURE) (HCC): ICD-10-CM

## 2022-10-05 DIAGNOSIS — C18.7 ADENOCARCINOMA OF SIGMOID COLON (HCC): ICD-10-CM

## 2022-10-05 DIAGNOSIS — I26.99 PE (PULMONARY THROMBOEMBOLISM) (HCC): Primary | ICD-10-CM

## 2022-10-05 DIAGNOSIS — E87.6 HYPOKALEMIA: ICD-10-CM

## 2022-10-05 DIAGNOSIS — J18.9 PNEUMONIA OF BOTH LOWER LOBES DUE TO INFECTIOUS ORGANISM: ICD-10-CM

## 2022-10-05 PROCEDURE — 99316 NF DSCHRG MGMT 30 MIN+: CPT | Performed by: NURSE PRACTITIONER

## 2022-10-05 NOTE — ASSESSMENT & PLAN NOTE
Heart rate stable  Patient is currently on metoprolol with hold parameter  Continue Eliquis for anticoagulation  Follow up with Cardiology outpatient for management

## 2022-10-05 NOTE — ASSESSMENT & PLAN NOTE
Acute RLL subsegmental  Oxygenating well on room air  No episodes of respiratory distress reported from patient or from nursing staff  Continue Eliquis for anticoagulation

## 2022-10-05 NOTE — ASSESSMENT & PLAN NOTE
Multifactorial  Patient participated in therapy during his SNF stay  Continue PT/OT with home health services  Goal is to discharge home on 10/07/2022 with home health services  Recommend that patient continue PT/OT to increase strength and endurance

## 2022-10-05 NOTE — PROGRESS NOTES
[facility]  [address]  [phone]  DISCHARGE SUMMARY  POS: 31 (UHZ)  Facility: [facility]    NAME: Delbert Peabody  AGE: 80 y o  SEX: male  DATE OF ADMISSION:  09/15/2022  DATE OF DISCHARGE:  10/07/2022 DISCHARGE DISPOSITION:  Home    Reason for admission: Patient was admitted from [facility] for rehabilitation after hospitalization for syncopal episode, sepsis, acute right lower lobe subsegmental PE with associated troponin elevation and AFib with RVR    Admission Diagnoses:  Syncope, AFib with RVR, sepsis due to pneumonia, acute PE  Additional Problems:   Past Medical History:   Diagnosis Date    Anemia     Atrial fibrillation ([facility] )     CHF (congestive heart failure) ([facility] )     Hyperlipidemia     Hypertension     TIA (transient ischemic attack)      Discharge Diagnoses: See problem list follow up recommendations below  Course of stay: Patient was admitted to [facility] for rehabilitation following hospitalization for above mentioned  At time of examination, patient is seated in chair, and appears in no distress  SBPs trending 80s to 100s and he is on metoprolol for atrial fibrillation  He denies shortness of breath, chest pain, headache, dizziness, presyncope or syncopal episodes, abdominal pain, nausea, vomiting, diarrhea or constipation  Patient gained 0 8 lb during his stay at SNF  During the resident's stay at [facility], he received skilled nursing care, PT, OT, dietitian support, social service support, and medical management for an overall improvement in his functional status  He is scheduled to return home on 10/07/2022  A referral was placed to [address] [place] [address] by social service to continue PT/OT, receive nursing services  Patient is ambulating up to 110 ft with Rollator       Labs and testing performed during stay:  Routine COVID-19 study negative on 09/29/2022, hemoglobin and hematocrit, CBC without diff, BMP    Discharge Medications: See discharge medication list which was reviewed in [facility] and compared to facility orders for accuracy  Status at time of discharge exam: Stable    Today's Visit: 10/5/917450:54 AM    Subjective:  No complaints    Review of systems:  As per review of present illness, all other systems reviewed and negative  Vitals:  Weight:  137 lb   BP: 82/56   temp: 97 5°   heart rate: 82   resp: 18    Exam: Physical Exam  Vitals and nursing note reviewed  Constitutional:       General: He is not in acute distress  Appearance: He is not toxic-appearing or diaphoretic  Comments: Elderly male who appears chronically ill  HENT:      Head: Normocephalic  Nose: No congestion or rhinorrhea  Mouth/Throat:      Mouth: Mucous membranes are moist       Pharynx: No oropharyngeal exudate  Eyes:      General: No scleral icterus  Right eye: No discharge  Left eye: No discharge  Extraocular Movements: Extraocular movements intact  Conjunctiva/sclera: Conjunctivae normal       Pupils: Pupils are equal, round, and reactive to light  Comments: Wears glasses  Cardiovascular:      Rate and Rhythm: Normal rate  Rhythm irregular  Pulses: Normal pulses  Pulmonary:      Effort: Pulmonary effort is normal  No respiratory distress  Breath sounds: Normal breath sounds  No wheezing, rhonchi or rales  Abdominal:      General: Bowel sounds are normal  There is no distension  Palpations: Abdomen is soft  Tenderness: There is no abdominal tenderness  There is no guarding  Musculoskeletal:      Cervical back: Neck supple  No rigidity  Right lower leg: Edema (Ankle edema) present  Left lower leg: Edema (Ankle edema) present  Comments: Moves all 4 extremities  Ambulatory with walker  Lymphadenopathy:      Cervical: No cervical adenopathy  Skin:     General: Skin is warm and dry        Capillary Refill: Capillary refill takes less than 2 seconds  Neurological:      Mental Status: He is alert  Mental status is at baseline  Motor: Weakness present  Gait: Gait abnormal    Psychiatric:         Mood and Affect: Mood normal          Behavior: Behavior normal          Thought Content: Thought content normal        Discussion with patient/family and further instructions:  -Fall precautions  -Aspiration precautions  -Bleeding precautions  -Monitor for signs/symptoms of infection  -Medication list was reviewed    Follow-up Recommendations: Please follow-up with your primary care physician within 7-10 days of discharge to review medication changes and current status       Problem List Follow-up Recommendations:  72-year-old male with:    PE (pulmonary thromboembolism) ([address] )  Acute RLL subsegmental  Oxygenating well on room air  No episodes of respiratory distress reported from patient or from nursing staff  Continue Eliquis for anticoagulation    Pneumonia  No complaints of coughing  Respiratory status has been stable since admission to [facility]    Atrial fibrillation with RVR ([facility] )  Heart rate stable  Patient is currently on metoprolol with hold parameter  Continue Eliquis for anticoagulation  Follow up with Cardiology outpatient for management    Adenocarcinoma of sigmoid colon [facility])  With loose stools  Recent hemoglobin 9 2/hematocrit 27 2 on 09/19/2022    Chronic blood loss anemia  In setting of chronic blood loss related to colon adenocarcinoma  Most recent hemoglobin and hematocrit stable    Chronic diastolic CHF (congestive heart failure) ([facility] )  Torsemide was discontinued on 09/23/2022 due to frequent loss of fluid from loose stools  Patient appears euvolemic on exam  Most recent BMP stable    Hypokalemia  Continue potassium supplement  Most recent potassium level 3 7    Severe protein-calorie malnutrition (HCC)  In setting of acute and chronic medical conditions as evidenced by low BMI, weight loss, loss of subcutaneous and muscle tissue  Appetite has improved and patient gained 0 8 lb during SNF stay  Continue nutritional supplements    Physical deconditioning  Multifactorial  Patient participated in therapy during his SNF stay  Continue PT/OT with home health services  Goal is to discharge home on 10/07/2022 with home health services  Recommend that patient continue PT/OT to increase strength and endurance    Obstructive uropathy  Continue indwelling urinary catheter until seen by Urology  Continue tamsulosin    I have spent >30 minutes with patient today in which greater than 50% of this time was spent in counseling/coordination of care regarding Diagnostic results, Intructions for management and Importance of tx compliance  PCP made aware of discharge summary via epic communications  This note was completed in part utilizing [employer] direct voice recognition software  Grammatical errors, random word insertion, spelling mistakes, and incomplete sentences may be an occasional consequence of the system secondary to software limitations, ambient noise and hardware issues  At the time of dictation, efforts were made to edit, clarify and/or correct errors  Please read the chart carefully and recognize, using context, where substitutions have occurred  If you have any questions or concerns about the context, text or information contained within the body of this dictation, please contact myself, the provider, for further clarification      Riky Herr  10/5/484543:54 AM

## 2022-10-05 NOTE — ASSESSMENT & PLAN NOTE
Torsemide was discontinued on 09/23/2022 due to frequent loss of fluid from loose stools  Patient appears euvolemic on exam  Most recent BMP stable

## 2022-10-05 NOTE — ASSESSMENT & PLAN NOTE
In setting of acute and chronic medical conditions as evidenced by low BMI, weight loss, loss of subcutaneous and muscle tissue  Appetite has improved and patient gained 0 8 lb during SNF stay  Continue nutritional supplements

## 2022-10-05 NOTE — ASSESSMENT & PLAN NOTE
In setting of chronic blood loss related to colon adenocarcinoma  Most recent hemoglobin and hematocrit stable